# Patient Record
Sex: MALE | Race: WHITE
[De-identification: names, ages, dates, MRNs, and addresses within clinical notes are randomized per-mention and may not be internally consistent; named-entity substitution may affect disease eponyms.]

---

## 2018-03-11 ENCOUNTER — HOSPITAL ENCOUNTER (EMERGENCY)
Dept: HOSPITAL 72 - EMR | Age: 38
Discharge: HOME | End: 2018-03-11
Payer: SELF-PAY

## 2018-03-11 VITALS — BODY MASS INDEX: 20.32 KG/M2 | HEIGHT: 72 IN | WEIGHT: 150 LBS

## 2018-03-11 VITALS — SYSTOLIC BLOOD PRESSURE: 117 MMHG | DIASTOLIC BLOOD PRESSURE: 80 MMHG
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
## 2018-03-11 DIAGNOSIS — S90.822A: Primary | ICD-10-CM

## 2018-03-11 DIAGNOSIS — T16.2XXA: ICD-10-CM

## 2018-03-11 DIAGNOSIS — S90.821A: ICD-10-CM

## 2018-03-11 DIAGNOSIS — Z88.0: ICD-10-CM
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
## 2018-03-11 PROCEDURE — 99283 EMERGENCY DEPT VISIT LOW MDM: CPT

## 2018-03-11 NOTE — EMERGENCY ROOM REPORT
History of Present Illness


General


Chief Complaint:  General Complaint





Present Illness


HPI


36 yo male patient presents to ER complaining of blisters on the bottom of his 

feet x1 day.


Patient reports blisters appeared last night; reports he popped them and they 

have become painful.


Patient reports he has been waking a lot.


States it is painful to walk on them.


Denies loss of sensation, loss of ROM of feet.


Denies puncture wound.


Denies fever, chest pain, SOB.


Allergies:  


Coded Allergies:  


     PENICILLINS (Verified  Allergy, Intermediate, 3/11/18)





Patient History


Past Medical History:  see triage record


Reviewed Nursing Documentation:  PMH: Agreed, PSxH: Agreed





Review of Systems


All Other Systems:  negative except mentioned in HPI





Physical Exam





Vital Signs








  Date Time  Temp Pulse Resp B/P (MAP) Pulse Ox O2 Delivery O2 Flow Rate FiO2


 


3/11/18 12:20 98.4 78 16 120/80 98 Room Air  





 98.4       








Sp02 EP Interpretation:  reviewed, normal


General Appearance:  well appearing, no apparent distress, alert, GCS 15


Head:  normocephalic, atraumatic


Eyes:  bilateral eye normal inspection, bilateral eye PERRL


ENT:  hearing grossly normal, normal pharynx, no angioedema, normal voice, TMs 

+ canals normal, uvula midline, moist mucus membranes, other - FB in left ear


Respiratory:  lungs clear, normal breath sounds, no rhonchi, no respiratory 

distress, no accessory muscle use, no wheezing, speaking full sentences


Cardiovascular #1:  regular rate, rhythm, no edema


Cardiovascular #2:  2+ dorsalis pedis (R), 2+ dorsalis pedis (L)


Musculoskeletal:  back normal, digits/nails normal, gait/station normal, normal 

range of motion, non-tender, no calf tenderness


Neurologic:  alert, oriented x3, responsive, motor strength/tone normal, 

sensory intact


Psychiatric:  mood/affect normal


Skin:  no rash, other - 1cm unroofed blisters on ball of feet, bilaterally; no 

active drainage, TTP, no bleeding, superifical; no surrounding erythema, no 

edema, no signs of infection


Lymphatic:  no adenopathy





Medical Decision Making


PA Attestation


Dr. Wolf  is my supervising Physician whom patient management has been 

discussed with.


Diagnostic Impression:  


 Primary Impression:  


 Blister


 Additional Impression:  


 Foreign body in ear


ER Course


Pt. presents to the ED c/o blisters.





Ddx considered but are not limited to atopic dermatitis, scabies, allergic 

reaction, friction blister.





Vital signs: are WNL, pt. is afebrile





ER COURSE:


Feet cleaned.. Bacitracin applied. Sterile dressing applied.





Patient began complaining of left ear pain. Reports FB in ear.


FB removed from ear. Patient tolerated procedure well.


No erythematous TM, no ear pain with pulling, no drainage.





DISCHARGE: 


At this time pt. is stable for d/c to home. Patient resting comfortably, in no 

acute distress, nontoxic appearing.


 Will provide printed patient care instructions, and any necessary 

prescriptions. 


-Rx given for Bacitracin





Care plan and follow up instructions have been discussed with the patient prior 

to discharge. 


Patient provided with list of healthcare clinics to establish primary care 

physician. 


Patient instructed to follow-up with primary care provider in 3 - 5 days.


Patient questions asked and answered.


Patient reports understanding and agreement to treatment plan.


ER precautions given. Patient instructed to return to ER immediately for any 

new or worsening of symptoms including but not limited to increasing SOB, 

persistent fever.





Last Vital Signs








  Date Time  Temp Pulse Resp B/P (MAP) Pulse Ox O2 Delivery O2 Flow Rate FiO2


 


3/11/18 12:20 98.4 78 16 120/80 98 Room Air  





 98.4       








Disposition:  HOME, SELF-CARE


Condition:  Stable


Scripts


Bacitracin/Polymyxin B Sulfate (BACITRACIN-POLYMYXIN OINTMENT) 28.35 Gm 

Oint...g.


1 APPLIC TP BID for 7 Days, GM


   Prov: Jonathan Spring         3/11/18





Additional Instructions:  


Followup with primary care provider in 3 -5 days. Contact insurance to receive 

name of nearby physicians you are able to see.


Wear proper fitting shoes.


Do not pick at blisters.


Take medications as directed. 


Patient questions asked and answered.


ER precautions given, patient instructed to return to ER immediately for any 

new or worsening of symptoms.











Jonathan Spring Mar 11, 2018 12:31

## 2020-11-26 ENCOUNTER — HOSPITAL ENCOUNTER (EMERGENCY)
Age: 40
Discharge: HOME | End: 2020-11-26
Attending: EMERGENCY MEDICINE
Payer: COMMERCIAL

## 2020-11-26 VITALS
TEMPERATURE: 98.4 F | SYSTOLIC BLOOD PRESSURE: 146 MMHG | HEART RATE: 86 BPM | WEIGHT: 175 LBS | DIASTOLIC BLOOD PRESSURE: 91 MMHG | OXYGEN SATURATION: 97 % | RESPIRATION RATE: 16 BRPM

## 2020-11-26 DIAGNOSIS — R30.0 DYSURIA: ICD-10-CM

## 2020-11-26 DIAGNOSIS — R36.9 PENILE DISCHARGE: ICD-10-CM

## 2020-11-26 DIAGNOSIS — Z20.2 STD EXPOSURE: Primary | ICD-10-CM

## 2020-11-26 PROCEDURE — 96372 THER/PROPH/DIAG INJ SC/IM: CPT

## 2020-11-26 PROCEDURE — 87491 CHLMYD TRACH DNA AMP PROBE: CPT

## 2020-11-26 PROCEDURE — 74011250636 HC RX REV CODE- 250/636: Performed by: EMERGENCY MEDICINE

## 2020-11-26 PROCEDURE — 99283 EMERGENCY DEPT VISIT LOW MDM: CPT

## 2020-11-26 PROCEDURE — 74011000250 HC RX REV CODE- 250: Performed by: EMERGENCY MEDICINE

## 2020-11-26 PROCEDURE — 74011250637 HC RX REV CODE- 250/637: Performed by: EMERGENCY MEDICINE

## 2020-11-26 RX ORDER — AZITHROMYCIN 250 MG/1
1000 TABLET, FILM COATED ORAL
Status: COMPLETED | OUTPATIENT
Start: 2020-11-26 | End: 2020-11-26

## 2020-11-26 RX ADMIN — LIDOCAINE HYDROCHLORIDE 1 G: 10 INJECTION, SOLUTION EPIDURAL; INFILTRATION; INTRACAUDAL; PERINEURAL at 07:55

## 2020-11-26 RX ADMIN — AZITHROMYCIN MONOHYDRATE 1000 MG: 250 TABLET ORAL at 07:55

## 2020-11-26 NOTE — DISCHARGE INSTRUCTIONS
Patient Education        Exposure to Sexually Transmitted Infections: Care Instructions  Your Care Instructions  Sexually transmitted infections (STIs) are those diseases spread by sexual contact. There are at least 20 different STIs, including chlamydia, gonorrhea, syphilis, and human immunodeficiency virus (HIV), which causes AIDS. Bacteria-caused STIs can be treated and cured. STIs caused by viruses, such as HIV, can be treated but not cured. Some STIs can reduce a woman's chances of getting pregnant in the future. STIs are spread during sexual contact, such as vaginal intercourse and oral or anal sex. Follow-up care is a key part of your treatment and safety. Be sure to make and go to all appointments, and call your doctor if you are having problems. It's also a good idea to know your test results and keep a list of the medicines you take. How can you care for yourself at home? · Your doctor may have given you a shot of antibiotics. If your doctor prescribed antibiotic pills, take them as directed. Do not stop taking them just because you feel better. You need to take the full course of antibiotics. · Do not have sexual contact while you have symptoms of an STI or are being treated for an STI. · Tell your sex partner (or partners) that he or she will need treatment. · If you are a woman, do not douche. Douching changes the normal balance of bacteria in the vagina and may spread an infection up into your reproductive organs. To prevent exposure to STIs in the future  · Use latex condoms every time you have sex. Use them from the beginning to the end of sexual contact. · Talk to your partner before you have sex. Find out if he or she has or is at risk for any STI. Keep in mind that a person may be able to spread an STI even if he or she does not have symptoms. · Do not have sex if you are being treated for an STI.   · Do not have sex with anyone who has symptoms of an STI, such as sores on the genitals or mouth.  · Having one sex partner (who does not have STIs and does not have sex with anyone else) is a good way to avoid STIs. When should you call for help? Call your doctor now or seek immediate medical care if:    · You have new pain in your belly or pelvis.     · You have symptoms of a urinary tract infection. These may include:  ? Pain or burning when you urinate. ? A frequent need to urinate without being able to pass much urine. ? Pain in the flank, which is just below the rib cage and above the waist on either side of the back. ? Blood in your urine. ? A fever.     · You have new or worsening pain or swelling in the scrotum. Watch closely for changes in your health, and be sure to contact your doctor if:    · You have unusual vaginal bleeding.     · You have a discharge from the vagina or penis.     · You have any new symptoms, such as sores, bumps, rashes, blisters, or warts.     · You have itching, tingling, pain, or burning in the genital or anal area.     · You think you may have an STI. Where can you learn more? Go to http://www.gray.com/  Enter M049 in the search box to learn more about \"Exposure to Sexually Transmitted Infections: Care Instructions. \"  Current as of: February 26, 2020               Content Version: 12.6  © 2006-2020 [employer]. Care instructions adapted under license by [employer] (which disclaims liability or warranty for this information). If you have questions about a medical condition or this instruction, always ask your healthcare professional. [place] [address] any warranty or liability for your use of this information.

## 2020-11-26 NOTE — ED PROVIDER NOTES
47 yo CM with no relevant PMHx presents with 1 wk h/o dysuria and penile discharge. Pt notes that he had a new sexual encounter about 1.5 wks ago and a few days later started having dysuria and penile discharge. Pt states he sometimes wakes up with discharge in pants. No fevers, no vomiting, no other symptoms. No previous history of STD. Past Medical History:   Diagnosis Date    Psychiatric disorder        History reviewed. No pertinent surgical history. History reviewed. No pertinent family history. Social History     Socioeconomic History    Marital status: SINGLE     Spouse name: Not on file    Number of children: Not on file    Years of education: Not on file    Highest education level: Not on file   Occupational History    Not on file   Social Needs    Financial resource strain: Not on file    Food insecurity     Worry: Not on file     Inability: Not on file    Transportation needs     Medical: Not on file     Non-medical: Not on file   Tobacco Use    Smoking status: Not on file   Substance and Sexual Activity    Alcohol use: Not on file    Drug use: Not on file    Sexual activity: Not on file   Lifestyle    Physical activity     Days per week: Not on file     Minutes per session: Not on file    Stress: Not on file   Relationships    Social connections     Talks on phone: Not on file     Gets together: Not on file     Attends Ten[employer] service: Not on file     Active member of club or organization: Not on file     Attends meetings of clubs or organizations: Not on file     Relationship status: Not on file    Intimate partner violence     Fear of current or ex partner: Not on file     Emotionally abused: Not on file     Physically abused: Not on file     Forced sexual activity: Not on file   Other Topics Concern    Not on file   Social History Narrative    Not on file         ALLERGIES: Penicillins    Review of Systems   Constitutional: Negative for fever.    HENT: Negative for trouble swallowing. Respiratory: Negative for shortness of breath. Cardiovascular: Negative for chest pain. Gastrointestinal: Negative for abdominal pain and vomiting. Genitourinary: Positive for discharge and dysuria. Skin: Negative for wound. Neurological: Negative for syncope. Psychiatric/Behavioral: Negative for behavioral problems. All other systems reviewed and are negative. Vitals:    11/26/20 0735   BP: (!) 146/91   Pulse: 86   Resp: 16   Temp: 98.4 °F (36.9 °C)   SpO2: 97%   Weight: 79.4 kg (175 lb)            Physical Exam  Vitals signs and nursing note reviewed. Constitutional:       General: He is not in acute distress. Appearance: He is well-developed. Comments: well-appearing, nad   HENT:      Head: Normocephalic and atraumatic. Neck:      Musculoskeletal: Normal range of motion. Cardiovascular:      Rate and Rhythm: Normal rate. Pulses: Normal pulses. Pulmonary:      Effort: Pulmonary effort is normal. No respiratory distress. Abdominal:      Palpations: Abdomen is soft. Tenderness: There is no abdominal tenderness. Musculoskeletal: Normal range of motion. Right lower leg: No edema. Left lower leg: No edema. Comments: Mechanically stable   Neurological:      Mental Status: He is alert and oriented to person, place, and time. Comments: No focal deficits noted   Psychiatric:         Behavior: Behavior normal.          MDM  Number of Diagnoses or Management Options  Dysuria:   Penile discharge:   STD exposure:   Diagnosis management comments: 45 yo CM with no relevant PMHx presents with 1 wk h/o dysuria and penile discharge. Pt with recent new sexual partner. Examination unremarkable. Will treat empirically as STD. Discussed poc for dc home, abx in ED, symptom management, safer sexual practice, follow-up, return precautions.        Amount and/or Complexity of Data Reviewed  Clinical lab tests: ordered  Review and summarize past medical records: yes    Patient Progress  Patient progress: stable         Procedures      PROGRESS NOTES    7:48 AM:   Sharmila Dumont MD arrives to the bedside to evaluate the patient. Answered the patient's questions regarding the treatment plan. CONSULTATIONS  None      MEDICATIONS ORDERED  Medications   cefTRIAXone (ROCEPHIN) 1 g in lidocaine (PF) (XYLOCAINE) 10 mg/mL (1 %) IM injection (has no administration in time range)   azithromycin (ZITHROMAX) tablet 1,000 mg (has no administration in time range)       RADIOLOGY INTERPRETATIONS  No orders to display       EKG READINGS/LABORATORY RESULTS  No results found for this or any previous visit (from the past 12 hour(s)). ED DIAGNOSIS & DISPOSITION INFORMATION  Diagnosis:   1. STD exposure    2. Dysuria    3.  Penile discharge        Disposition: Discharged    Follow-up Information     Follow up With Specialties Details [facility] [place]  Schedule an appointment as soon as possible for a visit  [address] [facility] [address] [address]  [phone]    [facility] EMERGENCY DEPT Emergency Medicine  As needed [address]  [phone]          Patient's Medications    No medications on file           Sharmila Dumont MD.

## 2020-11-26 NOTE — ED NOTES
I have reviewed discharge instructions with the patient. The patient verbalized understanding. No allergic reaction to meds on discharge.

## 2020-11-30 LAB
C TRACH RRNA SPEC QL NAA+PROBE: NEGATIVE
N GONORRHOEA RRNA SPEC QL NAA+PROBE: POSITIVE
SPECIMEN SOURCE: ABNORMAL

## 2020-12-03 ENCOUNTER — HOSPITAL ENCOUNTER (EMERGENCY)
Age: 40
Discharge: PSYCHIATRIC OR SUBSTANCE USE FACILITY | End: 2020-12-04
Attending: EMERGENCY MEDICINE
Payer: COMMERCIAL

## 2020-12-03 DIAGNOSIS — F25.9 SCHIZOAFFECTIVE DISORDER, UNSPECIFIED TYPE (HCC): ICD-10-CM

## 2020-12-03 DIAGNOSIS — R45.851 SUICIDAL IDEATION: Primary | ICD-10-CM

## 2020-12-03 LAB
AMPHET UR QL SCN: NEGATIVE
ANION GAP SERPL CALC-SCNC: 1 MMOL/L (ref 3–18)
APPEARANCE UR: CLEAR
BARBITURATES UR QL SCN: NEGATIVE
BASOPHILS # BLD: 0 K/UL (ref 0–0.1)
BASOPHILS NFR BLD: 0 % (ref 0–2)
BENZODIAZ UR QL: NEGATIVE
BILIRUB UR QL: NEGATIVE
BUN SERPL-MCNC: 9 MG/DL (ref 7–18)
BUN/CREAT SERPL: 10 (ref 12–20)
CALCIUM SERPL-MCNC: 9.8 MG/DL (ref 8.5–10.1)
CANNABINOIDS UR QL SCN: POSITIVE
CHLORIDE SERPL-SCNC: 105 MMOL/L (ref 100–111)
CO2 SERPL-SCNC: 32 MMOL/L (ref 21–32)
COCAINE UR QL SCN: NEGATIVE
COLOR UR: YELLOW
CREAT SERPL-MCNC: 0.86 MG/DL (ref 0.6–1.3)
DIFFERENTIAL METHOD BLD: ABNORMAL
EOSINOPHIL # BLD: 0.1 K/UL (ref 0–0.4)
EOSINOPHIL NFR BLD: 2 % (ref 0–5)
ERYTHROCYTE [DISTWIDTH] IN BLOOD BY AUTOMATED COUNT: 13.2 % (ref 11.6–14.5)
ETHANOL SERPL-MCNC: <3 MG/DL (ref 0–3)
GLUCOSE SERPL-MCNC: 91 MG/DL (ref 74–99)
GLUCOSE UR STRIP.AUTO-MCNC: NEGATIVE MG/DL
HCT VFR BLD AUTO: 53.4 % (ref 36–48)
HDSCOM,HDSCOM: ABNORMAL
HGB BLD-MCNC: 17.5 G/DL (ref 13–16)
HGB UR QL STRIP: NEGATIVE
KETONES UR QL STRIP.AUTO: NEGATIVE MG/DL
LEUKOCYTE ESTERASE UR QL STRIP.AUTO: NEGATIVE
LYMPHOCYTES # BLD: 2.5 K/UL (ref 0.9–3.6)
LYMPHOCYTES NFR BLD: 35 % (ref 21–52)
MCH RBC QN AUTO: 31.3 PG (ref 24–34)
MCHC RBC AUTO-ENTMCNC: 32.8 G/DL (ref 31–37)
MCV RBC AUTO: 95.4 FL (ref 74–97)
METHADONE UR QL: NEGATIVE
MONOCYTES # BLD: 0.5 K/UL (ref 0.05–1.2)
MONOCYTES NFR BLD: 7 % (ref 3–10)
NEUTS SEG # BLD: 4.1 K/UL (ref 1.8–8)
NEUTS SEG NFR BLD: 56 % (ref 40–73)
NITRITE UR QL STRIP.AUTO: NEGATIVE
OPIATES UR QL: NEGATIVE
PCP UR QL: NEGATIVE
PH UR STRIP: 7.5 [PH] (ref 5–8)
PLATELET # BLD AUTO: 255 K/UL (ref 135–420)
PMV BLD AUTO: 9.6 FL (ref 9.2–11.8)
POTASSIUM SERPL-SCNC: 4.9 MMOL/L (ref 3.5–5.5)
PROT UR STRIP-MCNC: NEGATIVE MG/DL
RBC # BLD AUTO: 5.6 M/UL (ref 4.7–5.5)
SODIUM SERPL-SCNC: 138 MMOL/L (ref 136–145)
SP GR UR REFRACTOMETRY: 1.02 (ref 1–1.03)
UROBILINOGEN UR QL STRIP.AUTO: 0.2 EU/DL (ref 0.2–1)
WBC # BLD AUTO: 7.2 K/UL (ref 4.6–13.2)

## 2020-12-03 PROCEDURE — 99285 EMERGENCY DEPT VISIT HI MDM: CPT

## 2020-12-03 PROCEDURE — 85025 COMPLETE CBC W/AUTO DIFF WBC: CPT

## 2020-12-03 PROCEDURE — 74011250637 HC RX REV CODE- 250/637: Performed by: EMERGENCY MEDICINE

## 2020-12-03 PROCEDURE — 80048 BASIC METABOLIC PNL TOTAL CA: CPT

## 2020-12-03 PROCEDURE — 80307 DRUG TEST PRSMV CHEM ANLYZR: CPT

## 2020-12-03 PROCEDURE — 81003 URINALYSIS AUTO W/O SCOPE: CPT

## 2020-12-03 RX ORDER — LORAZEPAM 1 MG/1
1-2 TABLET ORAL
Status: DISCONTINUED | OUTPATIENT
Start: 2020-12-03 | End: 2020-12-04 | Stop reason: HOSPADM

## 2020-12-03 RX ORDER — SERTRALINE HYDROCHLORIDE 50 MG/1
50 TABLET, FILM COATED ORAL DAILY
COMMUNITY
End: 2020-12-09

## 2020-12-03 RX ORDER — QUETIAPINE FUMARATE 400 MG/1
400 TABLET, FILM COATED ORAL 2 TIMES DAILY
COMMUNITY
End: 2020-12-09

## 2020-12-03 RX ORDER — TRAZODONE HYDROCHLORIDE 50 MG/1
50 TABLET ORAL
Status: DISCONTINUED | OUTPATIENT
Start: 2020-12-03 | End: 2020-12-04 | Stop reason: HOSPADM

## 2020-12-03 RX ORDER — LORAZEPAM 2 MG/ML
1 INJECTION INTRAMUSCULAR
Status: DISCONTINUED | OUTPATIENT
Start: 2020-12-03 | End: 2020-12-03

## 2020-12-03 RX ORDER — TRAZODONE HYDROCHLORIDE 150 MG/1
150 TABLET ORAL
COMMUNITY
End: 2020-12-09

## 2020-12-03 RX ORDER — QUETIAPINE FUMARATE 100 MG/1
100 TABLET, FILM COATED ORAL
Status: DISCONTINUED | OUTPATIENT
Start: 2020-12-03 | End: 2020-12-04 | Stop reason: HOSPADM

## 2020-12-03 RX ADMIN — QUETIAPINE FUMARATE 100 MG: 100 TABLET ORAL at 22:37

## 2020-12-03 RX ADMIN — LORAZEPAM 2 MG: 1 TABLET ORAL at 20:55

## 2020-12-03 RX ADMIN — TRAZODONE HYDROCHLORIDE 50 MG: 50 TABLET ORAL at 22:37

## 2020-12-03 NOTE — ED PROVIDER NOTES
30-year-old male with a chief complaint of suicidal ideations. Reports history of schizoaffective disorder. Patient reports that he has been off of his medication since February. Reports that he takes Zoloft, trazodone and Seroquel, trazodone and Seroquel are nightly. Reports increased insomnia recently, increased depression, anxiety and suicidal thoughts. Indicates this plan will potentially be to jump in front of traffic. Reports that he has been out in [place] and just moved back to within the past few months. He has been off of his medication since leaving [place]. Currently residing with his mother. Reports that he has multiple family members that are ill including his mother and father. He feels very stressed out by this and is having a lot of difficulty at home. Reports prior attempts with taking pills, cutting his wrists and has been hospitalized in the past.  Reports intermittent auditory hallucinations, no command hallucinations. No HI. Past Medical History:   Diagnosis Date    Psychiatric disorder        History reviewed. No pertinent surgical history. History reviewed. No pertinent family history. Social History     Socioeconomic History    Marital status: SINGLE     Spouse name: Not on file    Number of children: Not on file    Years of education: Not on file    Highest education level: Not on file   Occupational History    Not on file   Social Needs    Financial resource strain: Not on file    Food insecurity     Worry: Not on file     Inability: Not on file    Transportation needs     Medical: Not on file     Non-medical: Not on file   Tobacco Use    Smoking status: Current Every Day Smoker     Packs/day: 1.00    Smokeless tobacco: Never Used   Substance and Sexual Activity    Alcohol use:  Yes     Alcohol/week: 18.0 standard drinks     Types: 18 Cans of beer per week    Drug use: Yes     Types: Methamphetamines     Comment: last use 12/1/2020    Sexual activity: Not on file   Lifestyle    Physical activity     Days per week: Not on file     Minutes per session: Not on file    Stress: Not on file   Relationships    Social connections     Talks on phone: Not on file     Gets together: Not on file     Attends Restoration service: Not on file     Active member of club or organization: Not on file     Attends meetings of clubs or organizations: Not on file     Relationship status: Not on file    Intimate partner violence     Fear of current or ex partner: Not on file     Emotionally abused: Not on file     Physically abused: Not on file     Forced sexual activity: Not on file   Other Topics Concern    Not on file   Social History Narrative    Not on file         ALLERGIES: Penicillins    Review of Systems   Constitutional: Negative for chills and fever. HENT: Negative for congestion, rhinorrhea, sinus pressure and sneezing. Eyes: Negative for visual disturbance. Respiratory: Negative for cough and shortness of breath. Cardiovascular: Negative for chest pain. Gastrointestinal: Negative for abdominal pain, diarrhea, nausea and vomiting. Genitourinary: Negative for dysuria, frequency and urgency. Musculoskeletal: Negative for back pain and neck pain. Skin: Negative for rash. Neurological: Negative for syncope, numbness and headaches. Psychiatric/Behavioral: Positive for dysphoric mood and suicidal ideas. Vitals:    12/03/20 1121   BP: (!) 141/94   Pulse: 77   Resp: 20   Temp: 98.2 °F (36.8 °C)   SpO2: 99%   Weight: 79.4 kg (175 lb)   Height: 5' 11\" (1.803 m)            Physical Exam  Constitutional:       General: He is not in acute distress. Appearance: He is not ill-appearing or toxic-appearing. HENT:      Head: Normocephalic. Right Ear: External ear normal.      Left Ear: External ear normal.      Nose: Nose normal. No congestion or rhinorrhea.       Mouth/Throat:      Mouth: Mucous membranes are moist.   Eyes:      Pupils: Pupils are equal, round, and reactive to light. Neck:      Musculoskeletal: Normal range of motion and neck supple. Cardiovascular:      Rate and Rhythm: Normal rate and regular rhythm. Pulses: Normal pulses. Heart sounds: Normal heart sounds. No murmur. Pulmonary:      Effort: Pulmonary effort is normal.      Breath sounds: Normal breath sounds. No wheezing. Abdominal:      General: Abdomen is flat. Tenderness: There is no abdominal tenderness. There is no guarding or rebound. Musculoskeletal: Normal range of motion. General: No swelling or tenderness. Skin:     General: Skin is warm and dry. Capillary Refill: Capillary refill takes less than 2 seconds. Findings: No rash. Neurological:      General: No focal deficit present. Mental Status: He is alert. Psychiatric:         Attention and Perception: Attention and perception normal.         Mood and Affect: Mood is anxious. Affect is not tearful. Speech: Speech normal.         Behavior: Behavior normal. Behavior is cooperative. MDM  Number of Diagnoses or Management Options  Diagnosis management comments: Patient is a 78-year-old male with history of schizoaffective disorder presenting with increased depression and anxiety as well as suicidal ideations. Symptoms have been going on for several months. Worsening as time goes by while he is off his medications. Differential includes major depressive disorder, psychotic disorder, schizophrenia, bipolar disorder, emotional crisis, malingering, generalized anxiety disorder, schizoaffective disorder. We will do medical screening and medical clearance. I will have the patient seen by psychiatry and further disposition to follow. Patient was seen by psychiatry and medication recommendations were made.   I have ordered the medications recommended by the psychiatrist.  Patient will await bed placement, medically suitable for psychiatric placement. 10:07 PM : Pt care transferred to Dr. Zara Lopes, ED provider. History of patient complaint(s), available diagnostic reports and current treatment plan has been discussed thoroughly. Bedside rounding on patient occured : no . Intended disposition of patient : Psych PLacement  Pending diagnostics reports and/or labs (please list):     Dr. Dolores Abdi assistance in completion of this plan is greatly appreciated but it should be noted that I will be the provider of record for this patient. ED Course as of Dec 03 2325   Thu Dec 03, 2020   2106 Patient evaluated by telepsychiatry, made recommendations for inpatient. Requested initiation of CIWA protocol as well as 50 mg of trazodone at night and 100 mg of Seroquel at night to increase tomorrow night if he is still with us. Recommended against initiation of Zoloft at this time. [LAMIN]      ED Course User Index  [LAMIN] Jana Aguilar, DO       Procedures    Dr. Zara Lopes notes    I assumed care of this patient from Dr. Aries Hoskins. Patient pending placement. Labs reviewed and results noted below. Orders Placed This Encounter    CBC WITH AUTOMATED DIFF     Standing Status:   Standing     Number of Occurrences:   1    BASIC METABOLIC PANEL     Standing Status:   Standing     Number of Occurrences:   1    URINALYSIS W/ RFLX MICROSCOPIC     Standing Status:   Standing     Number of Occurrences:   1    ETHYL ALCOHOL     Standing Status:   Standing     Number of Occurrences:   1    Urine Drug Screen     Standing Status:   Standing     Number of Occurrences:   1    SARS-COV-2     Standing Status:   Standing     Number of Occurrences:   1     Order Specific Question:   Specimen source     Answer:   Nasopharyngeal [188]     Order Specific Question:   Is this test for diagnosis or screening? Answer:   Screening     Order Specific Question:   Symptomatic for COVID-19 as defined by CDC? Answer:   No     Order Specific Question:   Hospitalized for COVID-19? Answer:   No     Order Specific Question:   Admitted to ICU for COVID-19? Answer:   No     Order Specific Question:   Employed in healthcare setting? Answer:   No     Order Specific Question:   Resident in a congregate (group) care setting? Answer:   No     Order Specific Question:   Previously tested for COVID-19? Answer:   No    DIET REGULAR     Standing Status:   Standing     Number of Occurrences:   1     Order Specific Question:   Likes/Dislikes/Preferences     Answer:   finger foods    IP CONSULT TO PSYCHIATRY     Standing Status:   Standing     Number of Occurrences:   1     Order Specific Question:   Reason for Consult: Answer:   SI, Depression     Order Specific Question:   Did you call or speak to the consulting provider? Answer:   No     Order Specific Question:   Consult To     Answer:   Provide treatment rec    SUICIDE PRECAUTIONS     Standing Status:   Standing     Number of Occurrences:   1    sertraline (Zoloft) 50 mg tablet     Sig: Take 50 mg by mouth daily.  traZODone (DESYREL) 150 mg tablet     Sig: Take 150 mg by mouth nightly.  QUEtiapine (SEROqueL) 400 mg tablet     Sig: Take 400 mg by mouth two (2) times a day.  traZODone (DESYREL) tablet 50 mg    QUEtiapine (SEROquel) tablet 100 mg    DISCONTD: LORazepam (ATIVAN) injection 1 mg    LORazepam (ATIVAN) tablet 1-2 mg       Recent Results (from the past 24 hour(s))   CBC WITH AUTOMATED DIFF    Collection Time: 12/03/20 11:48 AM   Result Value Ref Range    WBC 7.2 4.6 - 13.2 K/uL    RBC 5.60 (H) 4.70 - 5.50 M/uL    HGB 17.5 (H) 13.0 - 16.0 g/dL    HCT 53.4 (H) 36.0 - 48.0 %    MCV 95.4 74.0 - 97.0 FL    MCH 31.3 24.0 - 34.0 PG    MCHC 32.8 31.0 - 37.0 g/dL    RDW 13.2 11.6 - 14.5 %    PLATELET 779 823 - 656 K/uL    MPV 9.6 9.2 - 11.8 FL    NEUTROPHILS 56 40 - 73 %    LYMPHOCYTES 35 21 - 52 %    MONOCYTES 7 3 - 10 %    EOSINOPHILS 2 0 - 5 %    BASOPHILS 0 0 - 2 %    ABS.  NEUTROPHILS 4.1 1.8 - 8.0 K/UL ABS. LYMPHOCYTES 2.5 0.9 - 3.6 K/UL    ABS. MONOCYTES 0.5 0.05 - 1.2 K/UL    ABS. EOSINOPHILS 0.1 0.0 - 0.4 K/UL    ABS. BASOPHILS 0.0 0.0 - 0.1 K/UL    DF AUTOMATED     METABOLIC PANEL, BASIC    Collection Time: 12/03/20 11:48 AM   Result Value Ref Range    Sodium 138 136 - 145 mmol/L    Potassium 4.9 3.5 - 5.5 mmol/L    Chloride 105 100 - 111 mmol/L    CO2 32 21 - 32 mmol/L    Anion gap 1 (L) 3.0 - 18 mmol/L    Glucose 91 74 - 99 mg/dL    BUN 9 7.0 - 18 MG/DL    Creatinine 0.86 0.6 - 1.3 MG/DL    BUN/Creatinine ratio 10 (L) 12 - 20      GFR est AA >60 >60 ml/min/1.73m2    GFR est non-AA >60 >60 ml/min/1.73m2    Calcium 9.8 8.5 - 10.1 MG/DL   URINALYSIS W/ RFLX MICROSCOPIC    Collection Time: 12/03/20 11:48 AM   Result Value Ref Range    Color YELLOW      Appearance CLEAR      Specific gravity 1.017 1.005 - 1.030      pH (UA) 7.5 5.0 - 8.0      Protein Negative NEG mg/dL    Glucose Negative NEG mg/dL    Ketone Negative NEG mg/dL    Bilirubin Negative NEG      Blood Negative NEG      Urobilinogen 0.2 0.2 - 1.0 EU/dL    Nitrites Negative NEG      Leukocyte Esterase Negative NEG     ETHYL ALCOHOL    Collection Time: 12/03/20 11:48 AM   Result Value Ref Range    ALCOHOL(ETHYL),SERUM <3 0 - 3 MG/DL   DRUG SCREEN, URINE    Collection Time: 12/03/20 11:48 AM   Result Value Ref Range    BENZODIAZEPINES Negative NEG      BARBITURATES Negative NEG      THC (TH-CANNABINOL) Positive (A) NEG      OPIATES Negative NEG      PCP(PHENCYCLIDINE) Negative NEG      COCAINE Negative NEG      AMPHETAMINES Negative NEG      METHADONE Negative NEG      HDSCOM (NOTE)        Patient was accepted to [facility] inpatient psychiatric unit by Dr. Spero Mortimer  Diagnosis:   1. Suicidal ideation    2.  Schizoaffective disorder, unspecified type ([address]          Disposition: Transferred to [facility]    Follow-up Information    None         Patient's Medications   Start Taking    No medications on file   Continue Taking QUETIAPINE (SEROQUEL) 400 MG TABLET    Take 400 mg by mouth two (2) times a day. SERTRALINE (ZOLOFT) 50 MG TABLET    Take 50 mg by mouth daily. TRAZODONE (DESYREL) 150 MG TABLET    Take 150 mg by mouth nightly.    These Medications have changed    No medications on file   Stop Taking    No medications on file

## 2020-12-03 NOTE — ED TRIAGE NOTES
Pt arrives with c/o increasing mental health stressors. Pt states he is SI, denies HI. Pt has a plan of jumping in front of a car to end his life. Pt has previous hx of SI with cutting his wrist, OD,. Pt does not have a provider here in [place].
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
## 2020-12-04 ENCOUNTER — HOSPITAL ENCOUNTER (INPATIENT)
Age: 40
LOS: 5 days | Discharge: HOME | DRG: 750 | End: 2020-12-09
Attending: PSYCHIATRY & NEUROLOGY | Admitting: PSYCHIATRY & NEUROLOGY
Payer: COMMERCIAL

## 2020-12-04 VITALS
BODY MASS INDEX: 24.5 KG/M2 | OXYGEN SATURATION: 97 % | DIASTOLIC BLOOD PRESSURE: 63 MMHG | HEIGHT: 71 IN | WEIGHT: 175 LBS | RESPIRATION RATE: 14 BRPM | TEMPERATURE: 97.5 F | HEART RATE: 76 BPM | SYSTOLIC BLOOD PRESSURE: 103 MMHG

## 2020-12-04 DIAGNOSIS — F25.1 SCHIZOAFFECTIVE DISORDER, DEPRESSIVE TYPE (HCC): ICD-10-CM

## 2020-12-04 PROBLEM — F25.0 SCHIZOAFFECTIVE DISORDER, BIPOLAR TYPE (HCC): Status: ACTIVE | Noted: 2020-12-04

## 2020-12-04 LAB
COVID-19 RAPID TEST, COVR: NOT DETECTED
HEALTH STATUS, XMCV2T: NORMAL
SOURCE, COVRS: NORMAL
SPECIMEN TYPE, XMCV1T: NORMAL

## 2020-12-04 PROCEDURE — 87635 SARS-COV-2 COVID-19 AMP PRB: CPT

## 2020-12-04 PROCEDURE — 65220000005 HC RM SEMIPRIVATE PSYCH 3 OR 4 BED

## 2020-12-04 PROCEDURE — 74011250637 HC RX REV CODE- 250/637: Performed by: PSYCHIATRY & NEUROLOGY

## 2020-12-04 RX ORDER — HALOPERIDOL 5 MG/ML
5 INJECTION INTRAMUSCULAR
Status: DISCONTINUED | OUTPATIENT
Start: 2020-12-04 | End: 2020-12-09 | Stop reason: HOSPADM

## 2020-12-04 RX ORDER — HYDROXYZINE PAMOATE 25 MG/1
25 CAPSULE ORAL
Status: DISCONTINUED | OUTPATIENT
Start: 2020-12-04 | End: 2020-12-09 | Stop reason: HOSPADM

## 2020-12-04 RX ORDER — BENZTROPINE MESYLATE 1 MG/ML
1 INJECTION INTRAMUSCULAR; INTRAVENOUS
Status: DISCONTINUED | OUTPATIENT
Start: 2020-12-04 | End: 2020-12-09 | Stop reason: HOSPADM

## 2020-12-04 RX ORDER — TRAZODONE HYDROCHLORIDE 50 MG/1
50 TABLET ORAL
Status: DISCONTINUED | OUTPATIENT
Start: 2020-12-04 | End: 2020-12-05

## 2020-12-04 RX ORDER — BENZTROPINE MESYLATE 1 MG/1
1 TABLET ORAL
Status: DISCONTINUED | OUTPATIENT
Start: 2020-12-04 | End: 2020-12-09 | Stop reason: HOSPADM

## 2020-12-04 RX ORDER — LORAZEPAM 2 MG/ML
1 INJECTION INTRAMUSCULAR
Status: DISCONTINUED | OUTPATIENT
Start: 2020-12-04 | End: 2020-12-09 | Stop reason: HOSPADM

## 2020-12-04 RX ORDER — HALOPERIDOL 5 MG/1
5 TABLET ORAL
Status: DISCONTINUED | OUTPATIENT
Start: 2020-12-04 | End: 2020-12-09 | Stop reason: HOSPADM

## 2020-12-04 RX ORDER — QUETIAPINE FUMARATE 100 MG/1
200 TABLET, FILM COATED ORAL
Status: DISCONTINUED | OUTPATIENT
Start: 2020-12-04 | End: 2020-12-06

## 2020-12-04 RX ADMIN — QUETIAPINE FUMARATE 200 MG: 100 TABLET ORAL at 20:08

## 2020-12-04 RX ADMIN — TRAZODONE HYDROCHLORIDE 50 MG: 50 TABLET ORAL at 20:08

## 2020-12-04 NOTE — ED NOTES
Purposeful rounding completed:     Side rails up x 2:  YES  Bed in low position and wheels locked: NO  Call bell within reach: Sitter at bedside   Comfort addressed: YES/ Pt sleeping     Toileting needs addressed: Pt sleeping   Plan of care reviewed/updated with patient and or family members: Pt sleeping   IV site assessed: N/A  Pain assessed and addressed: YES, Pt resting quietly in no apparent distress

## 2020-12-04 NOTE — ED NOTES
Pt refused Covid swab. Pt states it is too late \"to be sticking something up somebody nose, I am sleep\". Pt then stated I just don't want the test until I wake up.

## 2020-12-04 NOTE — ED NOTES
This patient has been recommended for inpatient treatment and is awaiting placement. The ED provider has reviewed the patients history and medications, diet, and treatments and will order as necessary.  The patient will be observed and attended to as their medical needs require and/or policy dictates home

## 2020-12-04 NOTE — ED NOTES
This patient has been recommended for inpatient treatment and is awaiting placement. The ED provider has reviewed the patients history and medications, diet, and treatments and will order as necessary.  The patient will be observed and attended to as their medical needs require and/or policy dictates    Sitter at the bedside recording 15 minute checks

## 2020-12-04 NOTE — ED NOTES
Verbal shift change report given to Alisa Enriquez (oncoming nurse) by Lyubov Carlson (offgoing nurse). Report included the following information SBAR, ED Summary and MAR.

## 2020-12-04 NOTE — ED NOTES
Purposeful rounding completed:     Side rails up x 2:  YES  Bed in low position and wheels locked: YES  Call bell within reach: Sitter at bedside   Comfort addressed: YES/ Pt sleeping     Toileting needs addressed: Pt sleeping   Plan of care reviewed/updated with patient and or family members: Pt sleeping   IV site assessed: N/A  Pain assessed and addressed: YES, Pt resting quietly in no apparent distress

## 2020-12-04 NOTE — ED NOTES
Patient taken to [facility] via [facility], staff were given the two belonging bags.  Patient was ambulatory on transfer
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
## 2020-12-05 PROBLEM — F25.1 SCHIZOAFFECTIVE DISORDER, DEPRESSIVE TYPE (HCC): Status: ACTIVE | Noted: 2020-12-04

## 2020-12-05 PROCEDURE — 99222 1ST HOSP IP/OBS MODERATE 55: CPT | Performed by: PSYCHIATRY & NEUROLOGY

## 2020-12-05 PROCEDURE — 74011250637 HC RX REV CODE- 250/637: Performed by: PSYCHIATRY & NEUROLOGY

## 2020-12-05 PROCEDURE — 65220000005 HC RM SEMIPRIVATE PSYCH 3 OR 4 BED

## 2020-12-05 RX ORDER — SERTRALINE HYDROCHLORIDE 50 MG/1
50 TABLET, FILM COATED ORAL DAILY
Status: DISCONTINUED | OUTPATIENT
Start: 2020-12-05 | End: 2020-12-08

## 2020-12-05 RX ORDER — TRAZODONE HYDROCHLORIDE 100 MG/1
100 TABLET ORAL
Status: DISCONTINUED | OUTPATIENT
Start: 2020-12-05 | End: 2020-12-09 | Stop reason: HOSPADM

## 2020-12-05 RX ADMIN — QUETIAPINE FUMARATE 200 MG: 100 TABLET ORAL at 20:03

## 2020-12-05 RX ADMIN — SERTRALINE HYDROCHLORIDE 50 MG: 50 TABLET ORAL at 14:07

## 2020-12-05 RX ADMIN — TRAZODONE HYDROCHLORIDE 100 MG: 100 TABLET ORAL at 20:03

## 2020-12-05 NOTE — PROGRESS NOTES
Problem: Falls - Risk of  Goal: *Absence of Falls  Description: Document Jacqueline Wang Fall Risk and appropriate interventions in the flowsheet. Pt will be free of harm daily. Outcome: Progressing Towards Goal  Note: Fall Risk Interventions:            Medication Interventions: Teach patient to arise slowly              Aeb pt free of falls this shift     Problem: Psychosis  Goal: *STG: Remains safe in hospital  Description: Pt will be free of harm daily  Outcome: Progressing Towards Goal  Note: Aeb pt free of harm this shift  Goal: *STG: Seeks staff when feelings of self harm or harm towards others arise  Description: Pt will contract for safety each shift  Outcome: Progressing Towards Goal  Note: Aeb pt contracts for safety  Goal: *STG/LTG: Complies with medication therapy  Description: Pt will take all meds as prescribed this shift  Outcome: Progressing Towards Goal  Note: Aeb pt taking all meds as prescribed this shift     Pt has been cooperative and appropriate with staff and peers. Spent the majority of the shift in his room; however, he did come out and interact with his peers/staff later in the shift. States that his AH are non-command and have slowed down, he is no longer feeling paranoid. Compliant with all meds. Denies SI/HI/VH at this time. Will continue to monitor for safety.

## 2020-12-05 NOTE — BH NOTES
The patient  Was monitored for safety. Pt did eat at all meals. Pt did talk with his DR and NP. Pt showed no signs of any negative behaviors. Pt did verbalize all issues. Pt mostly stayed in his room lying down. Staff will continue to monitor for safety and locations.

## 2020-12-05 NOTE — H&P
Psychiatry History and Physical    Subjective:     Date of Evaluation:  12/5/2020    Reason for Referral:  Juanriana Kirby was referred to the examiners from ED/[facility] for SI with plan to jump into traffic. History of Presenting Problem: 38y/o C male in NAD well developed and nourished. Reports he is still suicidal with some AH. Denies HI/VH. First time admit to MV/Psych. Covid -negative, UDS -positive for THC. Medical problems: Schizoaffective disorder, Bipolar type, Depression, Insomnia. Admits to history of self cutting when he was younger    Patient Active Problem List    Diagnosis Date Noted    Schizoaffective disorder, depressive type ([facility] [address] 12/04/2020     Past Medical History:   Diagnosis Date    Psychiatric disorder      No past surgical history on file. No family history on file. Social History     Tobacco Use    Smoking status: Current Every Day Smoker     Packs/day: 1.00    Smokeless tobacco: Never Used   Substance Use Topics    Alcohol use: Yes     Alcohol/week: 18.0 standard drinks     Types: 18 Cans of beer per week     Comment: everyday     Prior to Admission medications    Medication Sig Start Date End Date Taking? Authorizing Provider   sertraline (Zoloft) 50 mg tablet Take 50 mg by mouth daily. OtherAlexander MD   traZODone (DESYREL) 150 mg tablet Take 150 mg by mouth nightly. Alexander Galeas MD   QUEtiapine (SEROqueL) 400 mg tablet Take 400 mg by mouth two (2) times a day.     Alexander Galeas MD     Allergies   Allergen Reactions    Penicillins Unknown (comments)        Review of Systems - History obtained from chart review and the patient  General ROS: negative  Psychological ROS: positive for - depression, hallucinations and suicidal ideation  Ophthalmic ROS: negative  ENT ROS: negative  Respiratory ROS: no cough, shortness of breath, or wheezing  Cardiovascular ROS: no chest pain or dyspnea on exertion  Gastrointestinal ROS: no abdominal pain, change in bowel habits, or black or bloody stools  Genito-Urinary ROS: no dysuria, trouble voiding, or hematuria  Musculoskeletal ROS: negative  Neurological ROS: no TIA or stroke symptoms  Dermatological ROS: negative      Objective:     Patient Vitals for the past 8 hrs:   BP Temp Pulse Resp   12/05/20 0746 124/80 97.4 °F (36.3 °C) 60 16       Mental Status exam: WNL except for    Sensorium  oriented to time, place and person   Orientation person, place, time/date and situation   Relations cooperative   Eye Contact appropriate   Appearance:  age appropriate   Motor Behavior:  within normal limits   Speech:  normal volume   Vocabulary average   Thought Process: logical   Thought Content free of delusions and hallucinations   Suicidal ideations intention   Homicidal ideations no plan  and no intention   Mood:  depressed   Affect:  depressed   Memory recent  adequate   Memory remote:  adequate   Concentration:  adequate   Abstraction:  concrete   Insight:  good   Reliability fair   Judgment:  good         Physical Exam:   Visit Vitals  /80 (BP 1 Location: Right arm, BP Patient Position: At rest)   Pulse 60   Temp 97.4 °F (36.3 °C)   Resp 16     General appearance: alert, cooperative, no distress, appears stated age  Head: Normocephalic, without obvious abnormality, atraumatic  Eyes: negative  Ears: normal TM's and external ear canals AU  Nose: Nares normal. Septum midline. Mucosa normal. No drainage or sinus tenderness. Throat: Lips, mucosa, and tongue normal. Teeth and gums normal  Neck: supple, symmetrical, trachea midline, no adenopathy, thyroid: not enlarged, symmetric, no tenderness/mass/nodules, no carotid bruit and no JVD  Back: symmetric, no curvature. ROM normal. No CVA tenderness. Lungs: clear to auscultation bilaterally  Chest wall: no tenderness  Heart: regular rate and rhythm, S1, S2 normal, no murmur, click, rub or gallop  Abdomen: soft, non-tender.  Bowel sounds normal. No masses,  no organomegaly  Extremities: extremities normal, atraumatic, no cyanosis or edema  Pulses: 2+ and symmetric  Skin: Skin color, texture, turgor normal. No rashes or lesions  Lymph nodes: Cervical, supraclavicular, and axillary nodes normal.  Neurologic: Grossly normal        Impression:      Principal Problem:    Schizoaffective disorder, depressive type ([facility] [address] (12/4/2020)          Plan:     Recommendations for Treatment/Conditions:  Psychiatric treatment recommended while in hospital  Admit to Psych services for Depression/SI/AH    Referral To:    Inpatient psychiatric care      Blas Erickson NP   12/5/2020 11:20 AM

## 2020-12-05 NOTE — H&P
[address] [facility] HISTORY AND PHYSICAL    Name:  Kristi Patel  MR#:   [id number]  :  1980  ACCOUNT #:  [de-identified]  ADMIT DATE:  2020    IDENTIFYING INFORMATION:  The patient is a 67-year-old male, single, admitted to the facility on a voluntary basis on the above-mentioned date. BASIS FOR ADMISSION:  The patient presented himself to [facility] Emergency Room with a complaint of his being suicidal.  Describing suffering with a schizoaffective disorder, the patient reported being off his medications since 2020 when he moved from [place] to return back to the [facility], a place where his family resides. Staying at home with his grandfather, mother, and stepfather and Nichole Cluck friend that we called DD,\" and the patient describes becoming increasingly depressed and labile and afraid of losing control. Developing the presence of suicidal thoughts with a plan of jumping in front of a car and having a history of multiple suicidal attempts in the past, he decided to go to [facility], a place where he was medically cleared for psychiatric admission. A telepsych evaluation followed suggesting the patient to come in to a psychiatric facility. Our facility was contacted with the doctor on-call admitting the patient to my service and so the basis for this admission. PSYCHIATRIC HISTORY:  The patient was very irritable, not wanting to elaborate upon his prior psychiatric history; however, he has a history of at least eight prior psychiatric admissions, he did not want to elaborate upon where; however, it appears that mostly they were in [place]. The patient moved from [place] around 8 months or so ago as I had above mentioned. He did describe a history of positive treatment response to Seroquel 400 mg every night, trazodone 150 mg every night, and a low dose of sertraline 50 mg in the morning, which he stated does not help with his depressive symptoms.   However, he is able to tolerate this combination of medications well and indicating that he has been prescribed with multiple medications in the past; however, this combination of medications has been rather helpful. The voices are apparently telling him to [facility]" what people are saying, indicating to him that whatever these people are saying is not what they really mean. He denies command hallucinations. Denies homicidal ideations. However, his symptoms have got really to the point that he is concerned about hurting himself. Agreeable to inpatient care, the patient was referred to inpatient psychiatric hospitalization as indicated. MEDICAL HISTORY:  Negative with exception of his psychiatric history. SURGICAL HISTORY:  Described as negative. ALLERGIES:  THE PATIENT DESCRIBED TO BEING ALLERGIC TO PENICILLINS. Multiple labs were performed at the time of the patient's evaluation at the ER including a CBC with differential that showed mild elevation of RBC to 5.6, hemoglobin of 17.5, hematocrit of 53.4, otherwise, normal differential.  Urinalysis is negative. Basic metabolic panel showed sodium 138, potassium 4.9, chloride 105, blood sugar 91, BUN 9, creatinine 0.86, estimated GFR above 60 mL/minute. Urine drug screen positive for cannabis, otherwise, negative. Alcohol levels below 3. SARS-CoV 19 results proven to be negative from a nasopharyngeal sample. ALCOHOL AND DRUG HISTORY:  The patient described the use of cannabis on occasion at present, however in the past, he states he used many drugs. He was not specific about the types; however, it appears that he used to use crystal meth that at times he used intravenously. He indicated to the telepsychiatrist that he had used crystal meth about a week or so ago and wanting to stop using any drugs. However, he was not very open about talking about his alcohol or drug history with me.   He apparently is drinking 4 to 5 beers a day; however, he denies having alcohol withdrawal symptoms. PERSONAL HISTORY AND FAMILY HISTORY:  The patient moved from [place] to this area 8 months or so ago. He has a history of multiple psychiatric admissions usually in [place] he said. He was not very specific as to how old he was when he began to have psychiatric problems. However, he did indicate again that combination of Seroquel, trazodone and also a low dose of Zoloft had been helpful. During the evaluation, he states that staying with his grandfather, mother and stepfather and a friend of the family has not been helpful. \"All of them are elderly people that do not sleep at night, so I am going crazy with not being able to sleep. \"  He also indicated that one of the reasons he has not been able to consult for psychiatric care is the problem that patients are having with pandemic they are being able to find someone that would provide them with psychiatric care. MENTAL STATUS EXAMINATION:  This is a disheveled male who looks his stated age. There is no evidence of alcohol or any other type of drug-related signs of intoxication or withdrawal symptoms. The patient is coherent, shows quality of continuity of associations without evidence of flight of ideas, pressure of speech, ideas of reference or influence. However, he does describe ego-dystonic auditory hallucinations which are not commanding. The patient described suicidal thoughts at the time of his being evaluated at [facility] Emergency Room; however he at present, is able to talk to the staff if unable to control thoughts of self-harm. He denies wanting to harm anyone else. Intellectual capacity is average. Insight and judgment are currently appropriate and he is considered to be psychiatrically competent. CLINICAL IMPRESSION:  AXIS I:  Schizoaffective disorder, depressed type. Hallucinogenic use disorder, moderately severe. Cannabis use disorder, moderate. Alcohol use disorder, moderate.   AXIS II: Deferred. AXIS III:  ALLERGY TO PENICILLINS. TREATMENT PLAN:  1. The patient was admitted to the [facility], will be seen daily and will be referred to the groups within context of the program.  2.  The patient will have an inpatient  assigned to help with disposition after discharge. 3.  The patient will be prescribed with a combination of Seroquel and trazodone with Seroquel dose tonight being 200 mg, very possibly to be increased to 300 mg tomorrow night and trazodone increased to 100 mg every night as needed for insomnia. Zoloft has been also restarted, initial dose of 50 mg, would be increased to 100 after 2 or 3 days. 4.  Several lab tests have been requested including a lipid panel, A1c, TSH, and also hepatic function panel. 5.  The patient will have a history and physical exam performed by one of our nurse practitioners sometime today. ESTIMATED LENGTH OF STAY AND PROGNOSIS:  ELOS is 5 days. Prognosis appears to be at least fair with treatment compliance and obviously treatment response.       Jaime Francis MD      [id number]  D:  12/05/2020 10:24  T:  12/05/2020 15:31  JOB #:  [id number]

## 2020-12-06 LAB
ALBUMIN SERPL-MCNC: 3.4 G/DL (ref 3.4–5)
ALBUMIN/GLOB SERPL: 1.2 {RATIO} (ref 0.8–1.7)
ALP SERPL-CCNC: 94 U/L (ref 45–117)
ALT SERPL-CCNC: 152 U/L (ref 16–61)
AST SERPL-CCNC: 32 U/L (ref 10–38)
BILIRUB DIRECT SERPL-MCNC: 0.1 MG/DL (ref 0–0.2)
BILIRUB SERPL-MCNC: 0.4 MG/DL (ref 0.2–1)
CHOLEST SERPL-MCNC: 215 MG/DL
GLOBULIN SER CALC-MCNC: 2.8 G/DL (ref 2–4)
HBA1C MFR BLD: 4.9 % (ref 4.2–5.6)
HDLC SERPL-MCNC: 51 MG/DL (ref 40–60)
HDLC SERPL: 4.2 {RATIO} (ref 0–5)
LDLC SERPL CALC-MCNC: 148.6 MG/DL (ref 0–100)
LIPID PROFILE,FLP: ABNORMAL
PROT SERPL-MCNC: 6.2 G/DL (ref 6.4–8.2)
TRIGL SERPL-MCNC: 77 MG/DL (ref ?–150)
TSH SERPL DL<=0.05 MIU/L-ACNC: 2.71 UIU/ML (ref 0.36–3.74)
VLDLC SERPL CALC-MCNC: 15.4 MG/DL

## 2020-12-06 PROCEDURE — 99231 SBSQ HOSP IP/OBS SF/LOW 25: CPT | Performed by: PSYCHIATRY & NEUROLOGY

## 2020-12-06 PROCEDURE — 80076 HEPATIC FUNCTION PANEL: CPT

## 2020-12-06 PROCEDURE — 80061 LIPID PANEL: CPT

## 2020-12-06 PROCEDURE — 83036 HEMOGLOBIN GLYCOSYLATED A1C: CPT

## 2020-12-06 PROCEDURE — 84443 ASSAY THYROID STIM HORMONE: CPT

## 2020-12-06 PROCEDURE — 74011250637 HC RX REV CODE- 250/637: Performed by: PSYCHIATRY & NEUROLOGY

## 2020-12-06 PROCEDURE — 36415 COLL VENOUS BLD VENIPUNCTURE: CPT

## 2020-12-06 PROCEDURE — 65220000005 HC RM SEMIPRIVATE PSYCH 3 OR 4 BED

## 2020-12-06 RX ORDER — QUETIAPINE FUMARATE 300 MG/1
300 TABLET, FILM COATED ORAL
Status: DISCONTINUED | OUTPATIENT
Start: 2020-12-06 | End: 2020-12-09 | Stop reason: HOSPADM

## 2020-12-06 RX ADMIN — SERTRALINE HYDROCHLORIDE 50 MG: 50 TABLET ORAL at 09:01

## 2020-12-06 RX ADMIN — TRAZODONE HYDROCHLORIDE 100 MG: 100 TABLET ORAL at 20:40

## 2020-12-06 RX ADMIN — QUETIAPINE FUMARATE 300 MG: 300 TABLET ORAL at 20:40

## 2020-12-06 NOTE — BH NOTES
Pt was monitored for safety. Pt was calm and mostly stayed in his room lying down. Pt did eat at all meals. Pt did talk with his DR. Staff will continue to monitor for safety and locations.

## 2020-12-06 NOTE — PROGRESS NOTES
[address]  Inpatient Progress Note     Date of Service: 12/06/20  Hospital Day: 2     Subjective/Interval History   12/06/20    Treatment Team Notes:  Notes reviewed and/or discussed and report that Zander Birch is a patient with a chronic history of thought disorder, recently admitted to the facility with attention invited to his history and physical exam which is self-explanatory. Patient interview: Zander Birch was interviewed by this writer today. The patient did not want to respond to my calling him when I approach him during psych rounds today. One of the main problems that the patient has had prior admission is his not being able to sleep. So for that reason we chose not to wake him up. We will see again tomorrow. Objective     Visit Vitals  /80 (BP 1 Location: Right arm, BP Patient Position: At rest)   Pulse 88   Temp 96.9 °F (36.1 °C)   Resp 16     Vitals are stable    Recent Results (from the past 24 hour(s))   HEMOGLOBIN A1C W/O EAG    Collection Time: 12/06/20  6:31 AM   Result Value Ref Range    Hemoglobin A1c 4.9 4.2 - 5.6 %   LIPID PANEL    Collection Time: 12/06/20  6:31 AM   Result Value Ref Range    LIPID PROFILE          Cholesterol, total 215 (H) <200 MG/DL    Triglyceride 77 <150 MG/DL    HDL Cholesterol 51 40 - 60 MG/DL    LDL, calculated 148.6 (H) 0 - 100 MG/DL    VLDL, calculated 15.4 MG/DL    CHOL/HDL Ratio 4.2 0 - 5.0     TSH 3RD GENERATION    Collection Time: 12/06/20  6:31 AM   Result Value Ref Range    TSH 2.71 0.36 - 3.74 uIU/mL   HEPATIC FUNCTION PANEL    Collection Time: 12/06/20  6:31 AM   Result Value Ref Range    Protein, total 6.2 (L) 6.4 - 8.2 g/dL    Albumin 3.4 3.4 - 5.0 g/dL    Globulin 2.8 2.0 - 4.0 g/dL    A-G Ratio 1.2 0.8 - 1.7      Bilirubin, total 0.4 0.2 - 1.0 MG/DL    Bilirubin, direct 0.1 0.0 - 0.2 MG/DL    Alk.  phosphatase 94 45 - 117 U/L    AST (SGOT) 32 10 - 38 U/L    ALT (SGPT) 152 (H) 16 - 61 U/L     Studies with exception of an elevated ALT. We will obtain a hep C antibody test.  Could be drug related. Mental Status Examination     Appearance/Hygiene 45 y.o. WHITE OR  male  Hygiene: Poor   Behavior/Social Relatedness Appropriate, withdrawn at times, irritable others. Musculoskeletal Gait/Station: appropriate  Tone (flaccid, cogwheeling, spastic): not assessed  Psychomotor (hyperkinetic, hypokinetic): calm   Involuntary movements (tics, dyskinesias, akathisa, stereotypies): none   Speech   Rate, rhythm, volume, fluency and articulation are appropriate   Mood   still describes self as depressed per staff description   Affect    irritable   Thought Process Linear and goal directed   Thought Content and Perceptual Disturbances  depressed with psychotic symptoms upon admission, also recurrent suicidal thoughts as indicated on his history and physical exam.  The patient has been able to maintain self-control since admitted. Sensorium and Cognition  Grossly intact   Insight  Limited   Judgment  Limited to fair        Assessment/Plan      Psychiatric Diagnoses:   Patient Active Problem List   Diagnosis Code    [facility] F25.1       Medical Diagnoses: Same    Psychosocial and contextual factors: Same    Level of impairment/disability: Severe    1. Seroquel dose will be increased to 300 mg every night. Elevated ALT merits to rule out hep C antibody reactivity  2. Reviewed instructions, risks, benefits and side effects of medications  3.   Disposition/Discharge Date: self-care/home, to be determined    Chris Bhakta MD, [address] [place] [address]  Psychiatry

## 2020-12-07 LAB
HCV AB SER IA-ACNC: >11 INDEX
HCV AB SERPL QL IA: POSITIVE
HCV COMMENT,HCGAC: ABNORMAL

## 2020-12-07 PROCEDURE — 86803 HEPATITIS C AB TEST: CPT

## 2020-12-07 PROCEDURE — 99232 SBSQ HOSP IP/OBS MODERATE 35: CPT | Performed by: PSYCHIATRY & NEUROLOGY

## 2020-12-07 PROCEDURE — 74011250637 HC RX REV CODE- 250/637: Performed by: PSYCHIATRY & NEUROLOGY

## 2020-12-07 PROCEDURE — 36415 COLL VENOUS BLD VENIPUNCTURE: CPT

## 2020-12-07 PROCEDURE — 65220000005 HC RM SEMIPRIVATE PSYCH 3 OR 4 BED

## 2020-12-07 RX ADMIN — QUETIAPINE FUMARATE 300 MG: 300 TABLET ORAL at 20:24

## 2020-12-07 RX ADMIN — TRAZODONE HYDROCHLORIDE 100 MG: 100 TABLET ORAL at 20:24

## 2020-12-07 RX ADMIN — SERTRALINE HYDROCHLORIDE 50 MG: 50 TABLET ORAL at 08:54

## 2020-12-07 NOTE — BH NOTES
The patient was monitored for safety. Pt affect was calm and compliant. Pt mostly stayed in his room. Pt did come out to eat at all meals and took his medications. Pt encouraged to verbalize all issues. Pt did not participate in any groups or activities. Pt did talk with his Dr and SW. Pt able to focus and encouraged and work on his treatment plan. Staff will continue to monitor for safety and locations.

## 2020-12-07 NOTE — GROUP NOTE
IP  GROUP DOCUMENTATION INDIVIDUAL                                                                          Group Therapy Note    Date: 12/7/2020    Group Start Time: 1245  Group End Time: 1300  Group Topic: Nursing    [facility] SPECIAL TRTMT Walter Montoya, RN    IP [facility] GROUP DOCUMENTATION GROUP    Group Therapy Note    Attendees: 2         Attendance: Did not attend          Mateo Ventura RN

## 2020-12-07 NOTE — PROGRESS NOTES
Problem: Falls - Risk of  Goal: *Absence of Falls  Description: Document Chapito Lundy Fall Risk and appropriate interventions in the flowsheet. Pt will be free of harm daily. Outcome: Progressing Towards Goal  Note: Fall Risk Interventions:    Medication Interventions: Teach patient to arise slowly    Problem: Psychosis  Goal: *STG: Decreased hallucinations  Description: Pt will be free of hallucinations daily  Outcome: Progressing Towards Goal  Goal: *STG: Remains safe in hospital  Description: Pt will be free of harm daily  Outcome: Progressing Towards Goal  Goal: *STG: Seeks staff when feelings of self harm or harm towards others arise  Description: Pt will contract for safety each shift  Outcome: Progressing Towards Goal  Goal: *STG/LTG: Complies with medication therapy  Description: Pt will take all meds as prescribed this shift  Outcome: Progressing Towards Goal     Problem: Depressed Mood (Adult/Pediatric)  Goal: *STG: Participates in 1:1 therapy sessions  Description: Pt will attend all 1:1 therapy/social work sessions  Outcome: Progressing Towards Goal  Goal: *STG: Attends activities and groups  Description: Pt will attend all groups daily  Outcome: Progressing Towards Goal  The patient is up watching television. He is alert and orientated to time, place and situation. He denies patricio voices at this time. He contracts for safety. He denies that there are thoughts of harming others. The patient is medication compliant. Continuing to monitor and will continue to provide support.

## 2020-12-07 NOTE — BSMART NOTE
OCCUPATIONAL THERAPY PROGRESS NOTE  Group Time:  1400  Attendance: The patient attended full group. Participation:  The patient participated with moderate elaboration in the activity. Attention:  The patient needed redirection to activity at least once. Interaction:  The patient acknowledges others or responds to questions,  with no spontaneous interaction. Responses superficially appropriate. Seems guarded at times.

## 2020-12-07 NOTE — PROGRESS NOTES
[facility]  Inpatient Progress Note     Date of Service: 12/07/20  Hospital Day: 3     Subjective/Interval History   12/07/20    Treatment Team Notes:  Notes reviewed and/or discussed and report that Lynne Ramirez is a patient with a history of a schizoaffective disorder, recently admitted to our facility. Attention is invited to the dictated history and physical exam which is self-explanatory. Patient interview: Lynne Ramirez was interviewed by this writer today. During session the patient described beginning to feel a little better. He is currently tolerating his Seroquel dose very well and has been able to sleep a little better at night. That has helped with the patient's psychotic symptoms as one may expect. Also has help with his depression and his suicidal thoughts. So same treatment will continue. Objective     Visit Vitals  BP (!) 142/65 (BP 1 Location: Right arm, BP Patient Position: Sitting)   Pulse (!) 58   Temp 97.6 °F (36.4 °C)   Resp 16     Blood pressure remains elevated. Recent Results (from the past 24 hour(s))   HEPATITIS C AB    Collection Time: 12/07/20  7:46 AM   Result Value Ref Range    Hepatitis C virus Ab >11.00 (H) <0.80 Index    Hep C virus Ab Interp. Positive (A) NEG      Hep C  virus Ab comment           Above test results explain the patient's elevated liver enzymes. Will discuss the results with the patient. Mental Status Examination     Appearance/Hygiene 45 y.o. WHITE OR  male  Hygiene: Still limited   Behavior/Social Relatedness  still withdrawn, somewhat irritable. Musculoskeletal Gait/Station: appropriate  Tone (flaccid, cogwheeling, spastic): not assessed  Psychomotor (hyperkinetic, hypokinetic): calmer today.   Involuntary movements (tics, dyskinesias, akathisa, stereotypies): none   Speech   Rate, rhythm, volume, fluency and articulation are appropriate   Mood   less depressed   Affect    less irritable   Thought Process Linear and goal directed   Thought Content and Perceptual Disturbances  intensity and frequency of his auditory hallucinations are beginning to improve. Is still having problems with depression however it appears to be less intense. Suicidal thoughts are decreasing. Sensorium and Cognition  Grossly intact   Insight  improving   Judgment  improving        Assessment/Plan      Psychiatric Diagnoses:   Patient Active Problem List   Diagnosis Code    Schizoaffective disorder, depressive type ([facility] [address] F25.1       Medical Diagnoses: Same    Psychosocial and contextual factors: Same    Level of impairment/disability: Severe. 1.  Seroquel dose was increased last night. It is too early to increase it to 400 mg which I hope will be the dose that the patient will respond well to. Very possibly will increase his sertraline dose after I see him tomorrow. We are also going to discuss with him his hep C positivity. We will see him again in the morning. 2.  Reviewed instructions, risks, benefits and side effects of medications  3. Disposition/Discharge Date: self-care/home, to be determined.     Rere Capm MD, [address] [place] [address]  Psychiatry

## 2020-12-07 NOTE — GROUP NOTE
BOR[facility]  GROUP DOCUMENTATION INDIVIDUAL                                                                          Group Therapy Note    Date: 12/6/2020    Group Start Time: 0700  Group End Time: 0715  Group Topic: Nursing    [facility] SPECIAL TRT 1    Jericho Klein RN    IP [facility] GROUP DOCUMENTATION GROUP    Group Therapy Note    Attendees: 4         Attendance: Attended      Interventions/techniques: Informed    Follows Directions:  Followed directions    Interactions: Interacted appropriately    Mental Status: Calm    Behavior/appearance: Cooperative    Goals Achieved: Able to listen to others        Hero Rankin RN

## 2020-12-07 NOTE — BSMART NOTE
SOCIAL WORK GROUP THERAPY PROGRESS NOTE    Group Time:  10am    Group Topic:  Coping Skills    Group Participation:     Pt expressed not interested in attending session despite staff support

## 2020-12-07 NOTE — BSMART NOTE
BH Biopsychosocial Assessment    Current Level of Psychosocial Functioning     [x]Independent  []Dependent  []Minimal Assist      Comments:      Psychosocial High Risk Factors (check all that apply)      []Unable to obtain meds                                                               []Chronic illness/pain    []Substance abuse   []Lack of Family Support   [x]Financial stress   [x]Isolation   []Inadequate Community Resources  [x]Suicide ideations  [x]Not taking medications   []Victim of crime   []Developmental Delay  [x]Unable to manage personal needs    []Age 72 or older   []  Homeless  [x]Elizabet transportation   []Readmission within 30 days  []Unemployment  []Traumatic Event    Psychiatric Advanced Directive: none noted in chart. Family to involve in treatment: prefers we not contact them at this point    Sexual Orientation:  heterosexual    Patient Strengths: bright, wanting assistance with meds, respectful when on meds. Patient Barriers: guarded, can be irritable, hx of hallucinations. C D education provided: in groups & IT    Safety plan:pt will contract for safety with nursing staff     CMHC/ history:numerous psych hospitalizations mostly in [place], plus outpt tx    Plan of Care:  medication management, group/individual therapies, family meetings, psycho -education, treatment team meetings to assist with stabilization    Initial Discharge Plan: pt will be referred to [facility]    Clinical Summary:      Pt is a 40y/o C male in NAD well developed and nourished. Reports he is still suicidal with some AH. Denies HI/VH. First time admit to MV/Psych. Covid -negative, UDS -positive for THC. Medical problems: Schizoaffective disorder, Bipolar type, Depression, Insomnia.  Admits to history of self cutting when he was younger    Patient reported being off his medications since 02/2020 when he moved from [place] to return back to the [facility], a place where his family resides. Staying at home with his grandfather, mother, and stepfather and Pedro Luis Jump friend that we called DD,\" and the patient describes becoming increasingly depressed and labile and afraid of losing control. Developing the presence of suicidal thoughts with a plan of jumping in front of a car and having a history of multiple suicidal attempts in the past,     CLINICAL IMPRESSION:  AXIS I:  Schizoaffective disorder, depressed type. Hallucinogenic use disorder, moderately severe. Cannabis use disorder, moderate. Alcohol use disorder, moderate. AXIS II:  Deferred. Intervention: pt remained mostly quiet & resting in room as writer briefly explained how tx team operated & structure of the Adult Unit. Pt reminded how groups & activities are designed to provide relief, support & direction. Dr & tx team updated.

## 2020-12-08 PROCEDURE — 65220000005 HC RM SEMIPRIVATE PSYCH 3 OR 4 BED

## 2020-12-08 PROCEDURE — 74011250637 HC RX REV CODE- 250/637: Performed by: PSYCHIATRY & NEUROLOGY

## 2020-12-08 PROCEDURE — 99232 SBSQ HOSP IP/OBS MODERATE 35: CPT | Performed by: PSYCHIATRY & NEUROLOGY

## 2020-12-08 RX ORDER — SERTRALINE HYDROCHLORIDE 50 MG/1
100 TABLET, FILM COATED ORAL DAILY
Status: DISCONTINUED | OUTPATIENT
Start: 2020-12-09 | End: 2020-12-09 | Stop reason: HOSPADM

## 2020-12-08 RX ADMIN — QUETIAPINE FUMARATE 300 MG: 300 TABLET ORAL at 20:12

## 2020-12-08 RX ADMIN — TRAZODONE HYDROCHLORIDE 100 MG: 100 TABLET ORAL at 20:12

## 2020-12-08 RX ADMIN — SERTRALINE HYDROCHLORIDE 50 MG: 50 TABLET ORAL at 08:19

## 2020-12-08 NOTE — GROUP NOTE
BORIS  GROUP DOCUMENTATION INDIVIDUAL                                                                          Group Therapy Note    Date: 12/8/2020    Group Start Time: 0800  Group End Time: 0815  Group Topic: Nursing    [facility] SPECIAL TRTMT Walter Montoya, RN     [facility] GROUP    Group Therapy Note    Attendees: 4         Attendance: Did not attend            Donnell Mendez RN

## 2020-12-08 NOTE — PROGRESS NOTES
Problem: Falls - Risk of  Goal: *Absence of Falls  Description: Document Rosa Munoz Fall Risk and appropriate interventions in the flowsheet. Pt will be free of harm daily. Outcome: Progressing Towards Goal  Note: Fall Risk Interventions:            Medication Interventions: Teach patient to arise slowly                   Problem: Psychosis  Goal: *STG: Decreased hallucinations  Description: Pt will be free of hallucinations daily  Outcome: Progressing Towards Goal  Goal: *STG: Remains safe in hospital  Description: Pt will be free of harm daily  Outcome: Progressing Towards Goal  Patient states that he is feeling much better. He denies suicidal thoughts, denies auditory and visual hallucinations.

## 2020-12-08 NOTE — BSMART NOTE
OCCUPATIONAL THERAPY PROGRESS NOTE  Group Time:  1500  Attendance: The patient attended full group. Participation:  The patient participated fully in the activity. Attention:  The patient was able to focus on the activity. Interaction:  The patient occasionally  interacts with others. Attended group that was disrupted twice and shortened by unit circumstances. Mood improved, stated he was feeling better, able to ID change to work on.

## 2020-12-08 NOTE — PROGRESS NOTES
Problem: Psychosis  Goal: *STG: Remains safe in hospital  Description: Pt will be free of harm daily  Outcome: Progressing Towards Goal  Goal: *STG/LTG: Complies with medication therapy  Description: Pt will take all meds as prescribed this shift  Outcome: Progressing Towards Goal     Problem: Depressed Mood (Adult/Pediatric)  Goal: *STG: Attends activities and groups  Description: Pt will attend all groups daily  Outcome: Progressing Towards Goal       Pt was cooperative  and pleasant. Attended afternoon groups . Denied si/hi and avh. Medication and meal complaint.  Will continue to suport

## 2020-12-08 NOTE — BSMART NOTE
ART THERAPY GROUP PROGRESS NOTE    Group time:1250    The patient did not awaken/get up when called for group.

## 2020-12-08 NOTE — PROGRESS NOTES
[address] [facility] [address]  Inpatient Progress Note     Date of Service: 12/08/20  Hospital Day: 4     Subjective/Interval History   12/08/20    Treatment Team Notes:  Notes reviewed and/or discussed and report that Rossy Martins is a patient with a chronic history of a schizoaffective disorder presented to the treatment team today. Patient interview: Rossy Martins was interviewed by this writer today. During our treatment team session the patient described doing much better, and improved sleeping pattern has obviously been helpful, with the patient having an improvement on the intensity and frequency of his auditory hallucinations and more specifically with the recurrent suicidal thoughts that he was having. The patient is currently denying any thoughts of harming self or others and describes the auditory hallucinations being present only occasionally. The patient also denies any medications related side effects, with the possibility of being discharged tomorrow being raised      Objective     Visit Vitals  /69 (BP 1 Location: Right arm, BP Patient Position: Sitting)   Pulse 65   Temp 97.2 °F (36.2 °C)   Resp 18     Vitals are stable    No results found for this or any previous visit (from the past 24 hour(s)). Mental Status Examination     Appearance/Hygiene 45 y.o.  WHITE OR  male  Hygiene: Much improved   Behavior/Social Relatedness Appropriate   Musculoskeletal Gait/Station: appropriate  Tone (flaccid, cogwheeling, spastic): not assessed  Psychomotor (hyperkinetic, hypokinetic): calm   Involuntary movements (tics, dyskinesias, akathisa, stereotypies): none   Speech   Rate, rhythm, volume, fluency and articulation are appropriate   Mood   denies being depressed   Affect    appropriate to situation   Thought Process Linear and goal directed   Thought Content and Perceptual Disturbances Denies self-injurious behavior (SIB), suicidal ideation (SI), aggressive behavior or homicidal ideation (HI), auditory hallucination described as less intense and less frequent almost to baseline. Sensorium and Cognition  Grossly intact   Insight  much improved   Judgment  much improved        Assessment/Plan      Psychiatric Diagnoses:   Patient Active Problem List   Diagnosis Code    [facility]ive [facility], [facility] F25.1       Medical Diagnoses: Same    Psychosocial and contextual factors: Same    Level of impairment/disability: Moderately severe    1. The patient is tolerating current treatment with Seroquel very well. A dose of 300 mg at night is being very well-tolerated with, the same as sertraline 50 mg every morning is being well-tolerated also. We will proceed to increase his sertraline dose to 100 mg every day I would maintain Seroquel at the same dose of 300 mg at bedtime. Discharge tomorrow expected  2. Reviewed instructions, risks, benefits and side effects of medications  3. Disposition/Discharge Date: self-care/home, very possibly tomorrow.     Nadeem Lopez MD, [address] [place] [address]  Psychiatry

## 2020-12-09 VITALS
RESPIRATION RATE: 18 BRPM | SYSTOLIC BLOOD PRESSURE: 100 MMHG | TEMPERATURE: 97.5 F | DIASTOLIC BLOOD PRESSURE: 65 MMHG | HEART RATE: 58 BPM

## 2020-12-09 PROCEDURE — 99238 HOSP IP/OBS DSCHRG MGMT 30/<: CPT | Performed by: PSYCHIATRY & NEUROLOGY

## 2020-12-09 PROCEDURE — 74011250637 HC RX REV CODE- 250/637: Performed by: PSYCHIATRY & NEUROLOGY

## 2020-12-09 RX ORDER — QUETIAPINE FUMARATE 300 MG/1
300 TABLET, FILM COATED ORAL
Qty: 15 TAB | Refills: 1 | Status: SHIPPED | OUTPATIENT
Start: 2020-12-09

## 2020-12-09 RX ORDER — TRAZODONE HYDROCHLORIDE 100 MG/1
100 TABLET ORAL
Qty: 15 TAB | Refills: 1 | Status: SHIPPED | OUTPATIENT
Start: 2020-12-09

## 2020-12-09 RX ORDER — SERTRALINE HYDROCHLORIDE 100 MG/1
100 TABLET, FILM COATED ORAL DAILY
Qty: 15 TAB | Refills: 1 | Status: SHIPPED | OUTPATIENT
Start: 2020-12-10

## 2020-12-09 RX ADMIN — SERTRALINE HYDROCHLORIDE 100 MG: 50 TABLET ORAL at 08:16

## 2020-12-09 NOTE — GROUP NOTE
BORIS  GROUP DOCUMENTATION INDIVIDUAL                                                                          Group Therapy Note    Date: 12/8/2020    Group Start Time: 1815  Group End Time: 5766  Group Topic: Nursing    [facility] ADULT CHEM Elysia Cordoba    IP [address] GROUP DOCUMENTATION GROUP    Group Therapy Note    Attendees: 5         Attendance: Attended    Patient's Goal:  Discharge Planning    Interventions/techniques: Informed    Follows Directions:  Followed directions    Interactions: Interacted appropriately    Mental Status: Calm    Behavior/appearance: Cooperative    Goals Achieved: Able to manage/cope with feelings          Ranjit Alex

## 2020-12-09 NOTE — DISCHARGE SUMMARY
[address] [facility]    Name:  Hilario Bain  MR#:   [id number]  :  1980  ACCOUNT #:  [de-identified]  ADMIT DATE:  2020  DISCHARGE DATE:  2020    SIGNIFICANT FINDINGS:  History and physical exam was performed shortly after the patient was admitted to the facility, attention invited to this document, a place where the reasons for which the patient presented himself to [facility], the findings upon his being examined there and the reason for which he was referred to us for inpatient psychiatric care are there clearly stated. For the purpose of this discharge summary, the reader is advised that indeed the patient presented himself to [address] [place] [address] with a history of his being diagnosed with a schizoaffective disorder and not being able to get his medications prescribed for him. The patient had moved from [place] on or about February of this year, decided to return back to the [facility] to live with his family which appears to have been another reason for his being stressed out. Living in a household with six or seven adults, the patient described his not being able to sleep at night, this worsening his history of depression to the point in which he became suicidal.  His suicidal plan was to walk or jump in front of a moving car. The patient described a prior history of multiple suicidal attempts in the past and so the reason for which he decided to go to [address] [place] [address] and so when he was examined by the telepsychiatrist on-call, inpatient hospitalization was suggested and so the basis for this admission. Multiple labs were performed at the time of the patient's evaluation at the ER including a CBC with differential that showed mild elevation of RBC to 5.6, a slight elevation of hemoglobin to 17.5, otherwise normal results. Urinalysis was negative. Basic metabolic panel showed normal electrolytes, normal kidney functioning tests, and blood sugar of 91. Urine drug screen positive for cannabis, otherwise negative. Alcohol levels below 3. SARS-CoV-19 proven to be negative from rapid testing. COURSE DURING HOSPITALIZATION AND TREATMENT:  Admitted to the [facility], the patient has been seen daily and has been referred to the groups within context of the program.  Describing a history of positive treatment response to quetiapine, this atypical antipsychotic was prescribed with the patient showing a positive treatment response as the dose was increased to 300 mg every night which is his current night dosing. His ability to sleep was obviously improved. The auditory hallucinations that he described remitted and his depression began to improve, this also being helped by the restarting of treatment with Zoloft, increased to 100 mg a day, having the patient tolerated both medications without any evidence of side effects. Seen today, the patient is found to be much improved and ready to be discharged. CONDITION UPON DISCHARGE:  Not suicidal or homicidal, psychosis is much improved to the point in which the auditory hallucinations had remitted and he is showing no evidence of cognitive impairment. Multiple labs were performed while he was in the facility including a hepatic function panel that showed a mild elevation of ALT to 152 units/L, reason for which a hep C study was obtained, this coming back positive for hep C reactivity. The patient has been informed about it. In addition due to his prescription for quetiapine, a lipid panel was obtained that showed triglycerides of 77, total cholesterol of 215, HDL 51, cholesterol-HDL ratio of 4.2 and LDL of 148.6. A1c 4.9%. TSH done for completeness was normal at 2.71 international units/mL. FINAL DIAGNOSES:  AXIS I:  Schizoaffective disorder, depressive subtype. Cannabis use disorder, moderate. Alcohol use disorder, moderate. Hallucinogen use disorder, moderately severe.   AXIS II: Noncontributory. AXIS III:  Hepatitis C positive AB reactivity. Allergy to penicillins. DISPOSITION:  The patient is being discharged today. He has been advised strongly to consult with a primary care physician of his choice regarding his being hep C positive. He very possibly will have to be referred to GI for further evaluation for treatment. In addition, he is being referred to [address] [place] [address] for treatment of his schizoaffective disorder. The pt has a hx of IV drugs administration, and was unable to provide info regarding individuals he has shared needles with. He was also advised of a probable call from the Health Dept, since the hospital lab automatically will inform them of the positive Hep C AB test results. PRESCRIPTIONS UPON DISCHARGE:  Two weeks' prescriptions with one supply will be given on the following medications including Seroquel 300 mg every night, Zoloft 100 mg every morning and trazodone 100 mg at bedtime as needed for insomnia. PROGNOSIS:  At least fair with outpatient treatment compliance. The patient has been strongly advised to maintain absolute drug-related abstinence with suggestion for him to participate in a 12-step outpatient program with 90 meetings in 90 days. It is possible that those meetings are done virtually, although he was strongly suggested to consult with a CSB about the issue also.       Ashanti Romero MD, LFAPA      [id number]  D:  12/09/2020 10:37  T:  12/09/2020 15:14  JOB #:  [id number]

## 2020-12-09 NOTE — PROGRESS NOTES
Patient received discharge instructions, verbalized understanding of follow up appt. All personal items given to patient. Bust ticket provided.

## 2020-12-09 NOTE — SUICIDE SAFETY PLAN
SAFETY PLAN    A suicide Safety Plan is a document that supports someone when they are having thoughts of suicide. Warning Signs that indicate a suicidal crisis may be developing: What (situations, thoughts, feelings, body sensations, behaviors, etc.) do you experience that lets you know you are beginning to think about suicide? 1. withdrawn  *    Internal Coping Strategies:  What things can I do (relaxation techniques, hobbies, physical activities, etc.) to take my mind off my problems without contacting another person? 1. Bike ride      People and social settings that provide distraction: Who can I call or where can I go to distract me? 1. Name: family            People whom I can ask for help: Who can I call when I need help - for example, friends, family, clergy, someone else? 1. Name: family                  Professionals or [address] [place], [place] [address]vd I can contact during a crisis: Who can I call for help - for example, my doctor, my psychiatrist, my psychologist, a mental health provider, a suicide hotline? Suicide Prevention Lifeline: 1-761-678-TALK (4684)    4. [address] [place] [address] Emergency Services -  for example, [facility] suicide hotline, [facility] Hotline:           Emergency Services Phone: [phone]    Making the environment safe: How can I make my environment (house/apartment/living space) safer? For example, can I remove guns, medications, and other items?

## 2020-12-09 NOTE — BH NOTES
Patient has been interacting with peers and staff appropriately. Patient was assisted with all needs/wants. Patient denied SI/HI or AVH at current. Patient was able to review daily schedule over the phone. Patient tolerated medications without difficulty. PRN medication given to assist with sleep per patient's request.  Will continue to monitor and support as needed.

## 2020-12-09 NOTE — DISCHARGE INSTRUCTIONS
BEHAVIORAL HEALTH NURSING DISCHARGE NOTE      The following personal items collected during your admission are returned to you:   Dental Appliance: Dental Appliances: None  Vision: Visual Aid: None  Hearing Aid:    Jewelry: Jewelry: Ring  Clothing: Clothing: Grenville Canard  Other Valuables: Other Valuables: Adal Ward (comment)([place] state ID)  Valuables sent to safe:        PATIENT INSTRUCTIONS:             The discharge information has been reviewed with the patient. The patient verbalized understanding.         Patient armband removed and shredded